# Patient Record
Sex: FEMALE | Race: WHITE | NOT HISPANIC OR LATINO | Employment: FULL TIME | URBAN - METROPOLITAN AREA
[De-identification: names, ages, dates, MRNs, and addresses within clinical notes are randomized per-mention and may not be internally consistent; named-entity substitution may affect disease eponyms.]

---

## 2017-10-03 PROBLEM — E78.5 HYPERLIPIDEMIA: Status: ACTIVE | Noted: 2017-10-03

## 2017-10-03 PROBLEM — F41.9 ANXIETY: Status: ACTIVE | Noted: 2017-10-03

## 2021-05-10 ENCOUNTER — PATIENT MESSAGE (OUTPATIENT)
Dept: RESEARCH | Facility: HOSPITAL | Age: 61
End: 2021-05-10

## 2022-08-22 ENCOUNTER — TELEPHONE (OUTPATIENT)
Dept: OTOLARYNGOLOGY | Facility: CLINIC | Age: 62
End: 2022-08-22
Payer: COMMERCIAL

## 2022-08-22 NOTE — TELEPHONE ENCOUNTER
Spoke with pt and scheduled appt      ----- Message from Zaira Falcon sent at 8/22/2022 12:32 PM CDT -----  Contact: pt  Pt requesting call back re: scheduling appt. Nothing in epic.       Confirmed contact below:  Contact Name:Brenda Jennings  Phone Number: [phone]

## 2022-09-01 ENCOUNTER — CLINICAL SUPPORT (OUTPATIENT)
Dept: AUDIOLOGY | Facility: CLINIC | Age: 62
End: 2022-09-01
Payer: COMMERCIAL

## 2022-09-01 ENCOUNTER — OFFICE VISIT (OUTPATIENT)
Dept: OTOLARYNGOLOGY | Facility: CLINIC | Age: 62
End: 2022-09-01
Payer: COMMERCIAL

## 2022-09-01 VITALS — HEART RATE: 62 BPM | DIASTOLIC BLOOD PRESSURE: 75 MMHG | SYSTOLIC BLOOD PRESSURE: 108 MMHG

## 2022-09-01 DIAGNOSIS — R51.9 LEFT FACIAL PRESSURE AND PAIN: Primary | ICD-10-CM

## 2022-09-01 DIAGNOSIS — H93.12 TINNITUS, SUBJECTIVE, LEFT: ICD-10-CM

## 2022-09-01 DIAGNOSIS — H90.3 SENSORINEURAL HEARING LOSS (SNHL), BILATERAL: Primary | ICD-10-CM

## 2022-09-01 DIAGNOSIS — H93.8X2 SENSATION OF FULLNESS IN LEFT EAR: ICD-10-CM

## 2022-09-01 DIAGNOSIS — H90.3 SENSORINEURAL HEARING LOSS (SNHL) OF BOTH EARS: ICD-10-CM

## 2022-09-01 PROCEDURE — 99999 PR PBB SHADOW E&M-EST. PATIENT-LVL III: CPT | Mod: PBBFAC,,, | Performed by: NURSE PRACTITIONER

## 2022-09-01 PROCEDURE — 3074F PR MOST RECENT SYSTOLIC BLOOD PRESSURE < 130 MM HG: ICD-10-PCS | Mod: CPTII,S$GLB,, | Performed by: NURSE PRACTITIONER

## 2022-09-01 PROCEDURE — 99999 PR PBB SHADOW E&M-EST. PATIENT-LVL III: ICD-10-PCS | Mod: PBBFAC,,, | Performed by: NURSE PRACTITIONER

## 2022-09-01 PROCEDURE — 99999 PR PBB SHADOW E&M-EST. PATIENT-LVL I: CPT | Mod: PBBFAC,,,

## 2022-09-01 PROCEDURE — 92557 COMPREHENSIVE HEARING TEST: CPT | Mod: S$GLB,,,

## 2022-09-01 PROCEDURE — 3078F DIAST BP <80 MM HG: CPT | Mod: CPTII,S$GLB,, | Performed by: NURSE PRACTITIONER

## 2022-09-01 PROCEDURE — 99204 PR OFFICE/OUTPT VISIT, NEW, LEVL IV, 45-59 MIN: ICD-10-PCS | Mod: S$GLB,,, | Performed by: NURSE PRACTITIONER

## 2022-09-01 PROCEDURE — 3078F PR MOST RECENT DIASTOLIC BLOOD PRESSURE < 80 MM HG: ICD-10-PCS | Mod: CPTII,S$GLB,, | Performed by: NURSE PRACTITIONER

## 2022-09-01 PROCEDURE — 3074F SYST BP LT 130 MM HG: CPT | Mod: CPTII,S$GLB,, | Performed by: NURSE PRACTITIONER

## 2022-09-01 PROCEDURE — 92567 TYMPANOMETRY: CPT | Mod: S$GLB,,,

## 2022-09-01 PROCEDURE — 99999 PR PBB SHADOW E&M-EST. PATIENT-LVL I: ICD-10-PCS | Mod: PBBFAC,,,

## 2022-09-01 PROCEDURE — 1159F MED LIST DOCD IN RCRD: CPT | Mod: CPTII,S$GLB,, | Performed by: NURSE PRACTITIONER

## 2022-09-01 PROCEDURE — 99204 OFFICE O/P NEW MOD 45 MIN: CPT | Mod: S$GLB,,, | Performed by: NURSE PRACTITIONER

## 2022-09-01 PROCEDURE — 92567 PR TYMPA2METRY: ICD-10-PCS | Mod: S$GLB,,,

## 2022-09-01 PROCEDURE — 1159F PR MEDICATION LIST DOCUMENTED IN MEDICAL RECORD: ICD-10-PCS | Mod: CPTII,S$GLB,, | Performed by: NURSE PRACTITIONER

## 2022-09-01 PROCEDURE — 92557 PR COMPREHENSIVE HEARING TEST: ICD-10-PCS | Mod: S$GLB,,,

## 2022-09-01 NOTE — PROGRESS NOTES
"Brenda Jennings was seen today in the clinic for an audiologic evaluation. Mrs. Jennings reported aural fullness and otalgia in her left ear. She reported she visited another provider and was told she had fluid in her left ear. Mrs. Jennings reported "muffled" hearing in her left ear. She also endorsed numbness in her left maxilla. She reported a history of intermittent left sided "ringing" for years which has recently become constant. Mrs. Jennings denied true vertigo and history of noise exposure.    Tympanometry revealed Type A in the right ear and Type A in the left ear.     Audiogram results revealed a mild to moderate sensorineural hearing loss (SNHL) in the right ear and a mild to moderate SNHL in the left ear.      Speech reception thresholds were noted at 20 dBHL in the right ear and 20 dBHL in the left ear.    Speech discrimination scores were 96% in the right ear and 100% in the left ear.    Recommendations:  Otologic evaluation  Hearing aid consultation  Annual audiogram or sooner if change perceived  Hearing protection in noise        "

## 2022-09-01 NOTE — PROGRESS NOTES
Subjective:      Brenda Jennings is a 62 y.o. female who was self-referred for ear pain.    Brenda presents to clinic for the evaluation of left ear symptoms that has been intermittent for several years.  She reports left ear pain, muffled hearing, tinnitus, fullness with the sensation that there is fluid in the ear.  She also reports semi-numbness/pressure to left side of her face and nose.  She denies any purulent drainage or hyposmia.  There is slight nasal congestion.  She denies any gum, nut or ice chewing, as well as teeth grinding or clenching.  She is taking Allegra and Flonase inconsistently.     There is not a family history of hearing loss at a young age.  There is not a prior history of ear surgery.  There is not a prior history of ear infections.  There is not a history of ear trauma.  She denies a history of significant noise exposure.  She does not wear hearing aids currently.  She has not had a hearing test recently.      Past Medical History  She has a past medical history of Anxiety and Hyperlipidemia.    Past Surgical History  She has a past surgical history that includes Hysterectomy; Colonoscopy (03/31/2017); and Bladder suspension (2018).    Family History  Her family history includes Cancer in her father; Diabetes in her father and mother; Hyperlipidemia in her mother; Hypertension in her mother.    Social History  She reports that she has never smoked. She has never used smokeless tobacco. She reports that she does not drink alcohol and does not use drugs.    Allergies  She has No Known Allergies.    Medications  She has a current medication list which includes the following prescription(s): buspirone, estradiol, loratadine, lorazepam, and pantoprazole.    Review of Systems     Constitutional: Negative for appetite change, chills, fatigue, fever and unexpected weight loss.      HENT: Positive for ear pain, hearing loss, ringing in the ears and sinus pressure.  Negative for ear discharge, ear  infection, facial swelling, mouth sores, nosebleeds, postnasal drip, runny nose, sinus infection, sore throat and stuffy nose.      Respiratory:  Negative for cough, shortness of breath, sleep apnea, snoring and wheezing.      Cardiovascular:  Negative for chest pain, foot swelling, irregular heartbeat and swollen veins.     Gastrointestinal:  Negative for abdominal pain, acid reflux, constipation, diarrhea, heartburn and vomiting.       Objective:   /75   Pulse 62   LMP  (LMP Unknown)      Constitutional:   She is oriented to person, place, and time. She appears well-developed and well-nourished. She appears alert. She is cooperative.  Non-toxic appearance. She does not have a sickly appearance. She does not appear ill. No distress. Normal speech.      Head:  Normocephalic and atraumatic. Not macrocephalic and not microcephalic. Head is without right periorbital erythema, without left periorbital erythema and without TMJ tenderness. Salivary glands normal.  Facial strength is normal.      Ears:    Right Ear: No lacerations. No drainage, swelling or tenderness. No mastoid tenderness. Tympanic membrane is not injected, not scarred, not perforated, not erythematous, not retracted and not bulging. No middle ear effusion.   Left Ear: No lacerations. No drainage, swelling or tenderness. No mastoid tenderness. Tympanic membrane is not injected, not scarred, not perforated, not erythematous, not retracted and not bulging.  No middle ear effusion.     Nose:  Septal deviation (left anterior) present. No mucosal edema, rhinorrhea or sinus tenderness. No epistaxis. Right sinus exhibits no maxillary sinus tenderness and no frontal sinus tenderness. Left sinus exhibits maxillary sinus tenderness. Left sinus exhibits no frontal sinus tenderness.     Mouth/Throat  Oropharynx not clear and moist and abnormal uvula midline. Normal dentition. No uvula swelling. No oropharyngeal exudate. Tonsils not present.       Neck:  Trachea normal, phonation normal and no adenopathy.     Pulmonary/Chest:   Effort normal.     Psychiatric:   She has a normal mood and affect. Her speech is normal and behavior is normal.     Neurological:   She is alert and oriented to person, place, and time. She has neurological normal, alert and oriented.   Procedure  None    Data Reviewed  WBC (K/uL)   Date Value   10/07/2021 7.41     Eosinophil % (%)   Date Value   10/07/2021 1.5     Eos # (K/uL)   Date Value   10/07/2021 0.1     Platelets (K/uL)   Date Value   10/07/2021 277     Glucose (mg/dL)   Date Value   10/07/2021 107     No results found for: IGE    Imaging  No pertinent imaging available.    Audiogram    I independently reviewed the tracings of the complete audiometric evaluation performed today.  I reviewed the audiogram with the patient as well.  Pertinent findings include binaural sloping HF sensorineural hearing loss with normal tymps.  Assessment:     1. Left facial pressure and pain    2. Sensation of fullness in left ear    3. Sensorineural hearing loss (SNHL) of both ears      Plan:     Left facial pressure and pain  Sensation of fullness in left ear  SNHL  Sinus rinse daily, Flonase and allergy pill.   Audiometric testing interpretation consistent with sensorineural hearing loss.  Discussed the etiology of SNHL.  Medically cleared for hearing amplification, and will follow-up with Audiology if interested.  Hearing conservation in noisy environments.  Return to clinic every year for audiometric testing.    Scope at follow-up if no improvement.  Consider referral to Neuro if no abnormal findings.   Follow up in about 6 weeks (around 10/13/2022) for follow-up and Nasal Endoscopy.

## 2022-10-13 ENCOUNTER — OFFICE VISIT (OUTPATIENT)
Dept: OTOLARYNGOLOGY | Facility: CLINIC | Age: 62
End: 2022-10-13
Payer: COMMERCIAL

## 2022-10-13 VITALS — SYSTOLIC BLOOD PRESSURE: 113 MMHG | DIASTOLIC BLOOD PRESSURE: 76 MMHG | HEART RATE: 64 BPM

## 2022-10-13 DIAGNOSIS — H93.8X2 SENSATION OF FULLNESS IN LEFT EAR: ICD-10-CM

## 2022-10-13 DIAGNOSIS — H93.12 TINNITUS, LEFT: ICD-10-CM

## 2022-10-13 DIAGNOSIS — R51.9 LEFT FACIAL PRESSURE AND PAIN: Primary | ICD-10-CM

## 2022-10-13 DIAGNOSIS — H90.3 SENSORINEURAL HEARING LOSS (SNHL) OF BOTH EARS: ICD-10-CM

## 2022-10-13 PROCEDURE — 31231 NASAL/SINUS ENDOSCOPY: ICD-10-PCS | Mod: S$GLB,,, | Performed by: NURSE PRACTITIONER

## 2022-10-13 PROCEDURE — 99214 PR OFFICE/OUTPT VISIT, EST, LEVL IV, 30-39 MIN: ICD-10-PCS | Mod: 25,S$GLB,, | Performed by: NURSE PRACTITIONER

## 2022-10-13 PROCEDURE — 99999 PR PBB SHADOW E&M-EST. PATIENT-LVL II: CPT | Mod: PBBFAC,,, | Performed by: NURSE PRACTITIONER

## 2022-10-13 PROCEDURE — 31231 NASAL ENDOSCOPY DX: CPT | Mod: S$GLB,,, | Performed by: NURSE PRACTITIONER

## 2022-10-13 PROCEDURE — 99999 PR PBB SHADOW E&M-EST. PATIENT-LVL II: ICD-10-PCS | Mod: PBBFAC,,, | Performed by: NURSE PRACTITIONER

## 2022-10-13 PROCEDURE — 99214 OFFICE O/P EST MOD 30 MIN: CPT | Mod: 25,S$GLB,, | Performed by: NURSE PRACTITIONER

## 2022-10-13 NOTE — PROCEDURES
Nasal/sinus endoscopy    Date/Time: 10/13/2022 2:00 PM  Performed by: Inez Tejeda NP  Authorized by: Inez Tejeda NP     Consent Done?:  Yes (Verbal)  Anesthesia:     Local anesthetic:  Lidocaine 2% and Ytree-Synephrine 1/2%    Type of Endoscope:  Flexible  Nose:     Procedure Performed:  Nasal Endoscopy  External:      No external nasal deformity  Intranasal:      Mucosa no polyps     Mucosa ulcers not present     No mucosa lesions present     Turbinates not enlarged     No septum gross deformity  Nasopharynx:      No mucosa lesions     Adenoids not present     Posterior choanae not patent     Eustachian tube not patent     Procedure: With patient in seated position, the scope was inserted into bilateral nasal passageways and advanced atraumatically into the nasopharynx.  Structures examined are listed above.  After examination performed, the scope was removed atraumatically.  The patient tolerated the procedure well.

## 2022-10-13 NOTE — PROGRESS NOTES
Subjective:   Brenda is a 62 y.o. female who comes for follow-up.    Today:  Brenda Jennings presents for 6 weeks follow-up for left sided nasal pressure and left sided tinnitus.  She reports she has been compliant with the Flonase and has improvement with the nasal congestion.  She still reports numbness to the left side of the nose and upper lip.  This has caused anxiety for her and she is using Ativan every night to sleep.  She reports that the anxiety worsens the tinnitus, but the medication has helped significantly.     Note from previous visit on 9/1/2022:  Brenda presents to clinic for the evaluation of left ear symptoms that has been intermittent for several years.  She reports left ear pain, muffled hearing, tinnitus, fullness with the sensation that there is fluid in the ear.  She also reports semi-numbness/pressure to left side of her face and nose.  She denies any purulent drainage or hyposmia.  There is slight nasal congestion.  She denies any gum, nut or ice chewing, as well as teeth grinding or clenching.  She is taking Allegra and Flonase inconsistently.     There is not a family history of hearing loss at a young age.  There is not a prior history of ear surgery.  There is not a prior history of ear infections.  There is not a history of ear trauma.  She denies a history of significant noise exposure.  She does not wear hearing aids currently.  She has not had a hearing test recently.      Left facial pressure and pain/ Sensation of fullness in left ear/ SNHL  Sinus rinse daily, Flonase and allergy pill.   Audiometric testing interpretation consistent with sensorineural hearing loss.  Discussed the etiology of SNHL.  Medically cleared for hearing amplification, and will follow-up with Audiology if interested.  Hearing conservation in noisy environments.  Return to clinic every year for audiometric testing.    Scope at follow-up if no improvement.  Consider referral to Neuro if no abnormal findings.    Follow up in about 6 weeks (around 10/13/2022) for follow-up and Nasal Endoscopy.    The patient's medications, allergies, past medical, surgical, social and family histories were reviewed and updated as appropriate.    A detailed review of systems was obtained with pertinent positives as per the above HPI, and otherwise negative.   Objective:   /76   Pulse 64   LMP  (LMP Unknown)      Constitutional:   She is oriented to person, place, and time. She appears well-developed and well-nourished. She appears alert. She is cooperative.  Non-toxic appearance. She does not have a sickly appearance. She does not appear ill. No distress. Normal speech.      Head:  Normocephalic and atraumatic. Not macrocephalic and not microcephalic. Head is without right periorbital erythema, without left periorbital erythema and without TMJ tenderness. Salivary glands normal.  Facial strength is normal.      Ears:    Right Ear: No lacerations. No drainage, swelling or tenderness. No mastoid tenderness. Tympanic membrane is not injected, not scarred, not perforated, not erythematous, not retracted and not bulging. No middle ear effusion.   Left Ear: No lacerations. No drainage, swelling or tenderness. No mastoid tenderness. Tympanic membrane is not injected, not scarred, not perforated, not erythematous, not retracted and not bulging.  No middle ear effusion.     Nose:  No mucosal edema, rhinorrhea or sinus tenderness. No epistaxis. Right sinus exhibits no maxillary sinus tenderness and no frontal sinus tenderness. Left sinus exhibits no maxillary sinus tenderness and no frontal sinus tenderness.     Mouth/Throat  Oropharynx not clear and moist and abnormal uvula midline. Normal dentition. No uvula swelling. No oropharyngeal exudate.     Neck:  Trachea normal, phonation normal and no adenopathy.     Pulmonary/Chest:   Effort normal.     Psychiatric:   She has a normal mood and affect. Her speech is normal and behavior is normal.      Neurological:   She is alert and oriented to person, place, and time. She has neurological normal, alert and oriented.   Procedure  Nasal endoscopy performed.  See procedure note.    Data Reviewed  WBC (K/uL)   Date Value   10/07/2021 7.41     Eosinophil % (%)   Date Value   10/07/2021 1.5     Eos # (K/uL)   Date Value   10/07/2021 0.1     Platelets (K/uL)   Date Value   10/07/2021 277     Glucose (mg/dL)   Date Value   10/07/2021 107     No results found for: IGE  Audiogram    Imaging  No pertinent imaging available.  Assessment:     1. Left facial pressure and pain    2. Sensation of fullness in left ear    3. Sensorineural hearing loss (SNHL) of both ears    4. Tinnitus, left      Plan:   Left facial pressure and pain  -     Nasal/sinus endoscopy  Physical exam and nasal endoscopy unremarkable.  Recommended reaching out to a neurologist to further evaluate the facial numbness, however no masses or lesions in the NP.  Reassurance provided.      Sensation of fullness in left ear  Sensorineural hearing loss (SNHL) of both ears  Can consider hearing aid consultation, but she does not seem interested in this option at the moment.  Explained that this could be beneficial for both her hearing loss and tinnitus.     Tinnitus, left  Discussed again in detail, the etiology of tinnitus and management strategies including the use of background sound enrichment.  Further instructed that avoidance of caffeine, alcohol, tobacco, and stress can be of benefit.  Advised following up with her PCP or a physiatrist for medication recommendations.

## 2025-03-25 ENCOUNTER — TELEPHONE (OUTPATIENT)
Dept: NEUROLOGY | Facility: CLINIC | Age: 65
End: 2025-03-25
Payer: COMMERCIAL

## 2025-05-01 ENCOUNTER — OFFICE VISIT (OUTPATIENT)
Dept: PAIN MEDICINE | Facility: CLINIC | Age: 65
End: 2025-05-01
Payer: COMMERCIAL

## 2025-05-01 VITALS
HEIGHT: 64 IN | SYSTOLIC BLOOD PRESSURE: 126 MMHG | WEIGHT: 169.44 LBS | BODY MASS INDEX: 28.93 KG/M2 | HEART RATE: 77 BPM | DIASTOLIC BLOOD PRESSURE: 86 MMHG

## 2025-05-01 DIAGNOSIS — G89.29 CHRONIC NECK AND BACK PAIN: ICD-10-CM

## 2025-05-01 DIAGNOSIS — M75.51 SCAPULOTHORACIC BURSITIS OF BOTH SHOULDERS: Primary | ICD-10-CM

## 2025-05-01 DIAGNOSIS — M75.52 SCAPULOTHORACIC BURSITIS OF BOTH SHOULDERS: Primary | ICD-10-CM

## 2025-05-01 DIAGNOSIS — M54.2 CHRONIC NECK AND BACK PAIN: ICD-10-CM

## 2025-05-01 DIAGNOSIS — M54.9 CHRONIC NECK AND BACK PAIN: ICD-10-CM

## 2025-05-01 PROCEDURE — 99999 PR PBB SHADOW E&M-EST. PATIENT-LVL IV: CPT | Mod: PBBFAC,,, | Performed by: ANESTHESIOLOGY

## 2025-05-01 PROCEDURE — 1160F RVW MEDS BY RX/DR IN RCRD: CPT | Mod: CPTII,S$GLB,, | Performed by: ANESTHESIOLOGY

## 2025-05-01 PROCEDURE — 3008F BODY MASS INDEX DOCD: CPT | Mod: CPTII,S$GLB,, | Performed by: ANESTHESIOLOGY

## 2025-05-01 PROCEDURE — 1159F MED LIST DOCD IN RCRD: CPT | Mod: CPTII,S$GLB,, | Performed by: ANESTHESIOLOGY

## 2025-05-01 PROCEDURE — 3074F SYST BP LT 130 MM HG: CPT | Mod: CPTII,S$GLB,, | Performed by: ANESTHESIOLOGY

## 2025-05-01 PROCEDURE — 3079F DIAST BP 80-89 MM HG: CPT | Mod: CPTII,S$GLB,, | Performed by: ANESTHESIOLOGY

## 2025-05-01 PROCEDURE — 99204 OFFICE O/P NEW MOD 45 MIN: CPT | Mod: S$GLB,,, | Performed by: ANESTHESIOLOGY

## 2025-05-01 NOTE — PROGRESS NOTES
[facility] New Patient Evaluation      Referred by: Olesya Vasquez    PCP:     CC:   Chief Complaint   Patient presents with    Mid-back Pain    Neck Pain          5/1/2025     3:00 PM   Last 3 PDI Scores   Pain Disability Index (PDI) 28         HPI:   Brenda Jennings is a 65 y.o. female patient who has a past medical history of Anxiety and Hyperlipidemia. She presents with a 6-month history of bilateral shoulder blade pain. Pain initially started on the right side, described as a dull ache, and has since affected both sides. She reports the pain is significantly bothersome. Pain is localized under the shoulder blades and does not radiate down the arms or wrap around the chest. It stops at the inferior border of the scapulae, as demonstrated by her.  Pain affects daily activities, with her expressing concern about exacerbating the condition. It is exacerbated by reaching movements. She can reproduce the tenderness by attempting to reach under the shoulder blade area.  PT has provided some relief, but pain persists. During PT, the therapist noted soreness along several places on the spine.      Pain Intervention History:      Past Spine Surgical History:      Past and current medications:  Antineuropathics:  NSAIDs: celebrex   Physical therapy: yes, completed   Antidepressants:  Muscle relaxers:  Opioids:  Antiplatelets/Anticoagulants:    History:  Current Medications[1]    Past Medical History:   Diagnosis Date    Anxiety     Hyperlipidemia        Past Surgical History:   Procedure Laterality Date    BLADDER SUSPENSION  2018    COLONOSCOPY  03/31/2017    Dr Dane Garcia    HYSTERECTOMY         Family History   Problem Relation Name Age of Onset    Diabetes Mother      Hyperlipidemia Mother      Hypertension Mother      Diabetes Father      Cancer Father         Social History     Socioeconomic History    Marital status:    Tobacco Use    Smoking status: Never    Smokeless tobacco: Never  "  Substance and Sexual Activity    Alcohol use: No     Alcohol/week: 0.0 standard drinks of alcohol    Drug use: Never    Sexual activity: Yes     Partners: Male     Social Drivers of Health     Financial Resource Strain: Low Risk  (2/23/2025)    Overall Financial Resource Strain (CARDIA)     Difficulty of Paying Living Expenses: Not hard at all   Food Insecurity: No Food Insecurity (2/23/2025)    Hunger Vital Sign     Worried About Running Out of Food in the Last Year: Never true     Ran Out of Food in the Last Year: Never true   Transportation Needs: No Transportation Needs (2/23/2025)    PRAPARE - Transportation     Lack of Transportation (Medical): No     Lack of Transportation (Non-Medical): No   Physical Activity: Insufficiently Active (2/23/2025)    Exercise Vital Sign     Days of Exercise per Week: 2 days     Minutes of Exercise per Session: 30 min   Stress: Stress Concern Present (2/23/2025)    [employer] - Occupational Stress Questionnaire     Feeling of Stress : To some extent   Housing Stability: Low Risk  (2/23/2025)    Housing Stability Vital Sign     Unable to Pay for Housing in the Last Year: No     Homeless in the Last Year: No       Review of patient's allergies indicates:  No Known Allergies    Review of Systems:  12 point review of systems is negative.    Physical Exam:  Vitals:    05/01/25 1501   BP: 126/86   Pulse: 77   Weight: 76.9 kg (169 lb 6.8 oz)   Height: 5' 4" (1.626 m)   PainSc:   2   PainLoc: Back     Body mass index is 29.08 kg/m².    Gen: NAD  Psych: mood appropriate for given condition  HEENT: eyes anicteric   CV: RRR  HEENT: anicteric   Respiratory: non-labored, no signs of respiratory distress  Abd: non-distended  Skin: warm, dry and intact.  Gait: No antalgic gait.     Tenderness to palpation over the bilateral scapulothoracic bursa    Sensory:  Intact and symmetrical to light touch in C4-T1 dermatomes bilaterally.     Motor:    Right Left   C4 " Shoulder Abduction  5  5   C5 Elbow Flexion    5  5   C6 Wrist Extension  5  5   C7 Elbow Extension   5  5   C8/T1 Hand Intrinsics   5  5      Right Left   Triceps DTR 0 0   Biceps DTR 0 0        Patellar DTR 2+ 2+                           Labs:  Lab Results   Component Value Date    HGBA1C 5.8 (H) 12/16/2024       Lab Results   Component Value Date    WBC 11.53 12/16/2024    HGB 15.2 12/16/2024    HCT 47.4 12/16/2024    MCV 94 12/16/2024     12/16/2024           Imaging:  Xray cervical spine 3/25/25  FINDINGS:  Trace retrolisthesis of C4 on C5 insert 7 C6.  Trace anterolisthesis of C7 on T1.  Lateral masses of C1 and C2 are congruent.  Vertebral body heights are preserved.  No acute fracture.  Multilevel endplate changes and disc height loss, greatest at C5-6.  Oblique views demonstrate no high-grade osseous foraminal narrowing.  No prevertebral soft tissue abnormality or radiopaque soft tissue foreign body.    Xray thoracic spine 3/25/25  FINDINGS:  Twelve rib-bearing thoracic vertebral bodies.  Alignment is within normal limits.  Vertebral body heights are preserved.  No acute fracture.  Disc spaces are preserved.  Minimal endplate changes.  No radiopaque soft tissue foreign body.  No dense lobar consolidation in the visualized lungs.      Diagnosis:  1. Scapulothoracic bursitis of both shoulders  -     Ambulatory referral/consult to Physical Medicine Rehab; Future; Expected date: 05/08/2025    2. Chronic neck and back pain  -     Ambulatory referral/consult to Pain Clinic          Brenda Jennings is a 65 y.o. female patient who has a past medical history of Anxiety and Hyperlipidemia. She presents with a 6-month history of bilateral shoulder blade pain. Pain initially started on the right side, described as a dull ache, and has since affected both sides. She reports the pain is significantly bothersome. Pain is localized under the shoulder blades and does not radiate down the arms or wrap around the  chest. It stops at the inferior border of the scapulae, as demonstrated by her.  Pain affects daily activities, with her expressing concern about exacerbating the condition. It is exacerbated by reaching movements. She can reproduce the tenderness by attempting to reach under the shoulder blade area.  PT has provided some relief, but pain persists. During PT, the therapist noted soreness along several places on the spine.    - on exam she has full strength of her upper extremities intact sensation to light touch bilateral C4-T1.  She has reproducible pain with palpation over the bilateral scapulothoracic bursa  - I independently reviewed her cervical x-ray and she has multilevel bilateral facet arthropathy.  I independently reviewed her thoracic x-ray and I do not see any significant bony abnormality  - she has completed formal physical therapy without significant improvement in her symptoms  - I suspect her pain is related to a scapulothoracic bursitis bilaterally.  I have sent a referral for her to see 1 of our PM&R physicians to consider ultrasound-guided bursa injections  - discussed that if she does not get improvement after the injection to reach out to me and my next step in management would be to get cervical thoracic MRIs to evaluate her spinal anatomy.      : Not applicable    Grzegorz Thompson M.D.  Interventional Pain Medicine / Anesthesiology    This note was completed with dictation software and grammatical errors may exist.         [1]   Current Outpatient Medications:     alendronate (FOSAMAX) 70 MG tablet, Take 1 tablet (70 mg total) by mouth every 7 days., Disp: 4 tablet, Rfl: 11    celecoxib (CELEBREX) 100 MG capsule, Take 1 capsule (100 mg total) by mouth 2 (two) times daily., Disp: 60 capsule, Rfl: 2    loratadine (CLARITIN) 10 mg tablet, Take 10 mg by mouth once daily., Disp: , Rfl: